# Patient Record
Sex: FEMALE | URBAN - METROPOLITAN AREA
[De-identification: names, ages, dates, MRNs, and addresses within clinical notes are randomized per-mention and may not be internally consistent; named-entity substitution may affect disease eponyms.]

---

## 2021-12-22 ENCOUNTER — APPOINTMENT (RX ONLY)
Dept: URBAN - METROPOLITAN AREA CLINIC 71 | Facility: CLINIC | Age: 41
Setting detail: DERMATOLOGY
End: 2021-12-22

## 2021-12-22 ENCOUNTER — APPOINTMENT (RX ONLY)
Dept: URBAN - METROPOLITAN AREA CLINIC 70 | Facility: CLINIC | Age: 41
Setting detail: DERMATOLOGY
End: 2021-12-22

## 2021-12-22 DIAGNOSIS — Z41.9 ENCOUNTER FOR PROCEDURE FOR PURPOSES OTHER THAN REMEDYING HEALTH STATE, UNSPECIFIED: ICD-10-CM

## 2021-12-22 PROCEDURE — ? DERMAPLANE

## 2021-12-22 PROCEDURE — ? HYDRAFACIAL

## 2021-12-22 ASSESSMENT — LOCATION ZONE DERM
LOCATION ZONE: LIP
LOCATION ZONE: LIP
LOCATION ZONE: FACE
LOCATION ZONE: FACE

## 2021-12-22 ASSESSMENT — LOCATION DETAILED DESCRIPTION DERM
LOCATION DETAILED: RIGHT CENTRAL MALAR CHEEK
LOCATION DETAILED: LEFT MEDIAL FOREHEAD
LOCATION DETAILED: LEFT MEDIAL FOREHEAD
LOCATION DETAILED: RIGHT CENTRAL MALAR CHEEK
LOCATION DETAILED: RIGHT LOWER CUTANEOUS LIP
LOCATION DETAILED: RIGHT LOWER CUTANEOUS LIP
LOCATION DETAILED: RIGHT MEDIAL MALAR CHEEK
LOCATION DETAILED: LEFT CENTRAL MALAR CHEEK
LOCATION DETAILED: LEFT CENTRAL MALAR CHEEK
LOCATION DETAILED: LEFT LOWER CUTANEOUS LIP
LOCATION DETAILED: RIGHT INFERIOR MEDIAL FOREHEAD
LOCATION DETAILED: RIGHT MEDIAL MALAR CHEEK
LOCATION DETAILED: LEFT LOWER CUTANEOUS LIP
LOCATION DETAILED: RIGHT INFERIOR MEDIAL FOREHEAD

## 2021-12-22 ASSESSMENT — LOCATION SIMPLE DESCRIPTION DERM
LOCATION SIMPLE: RIGHT CHEEK
LOCATION SIMPLE: LEFT LIP
LOCATION SIMPLE: LEFT FOREHEAD
LOCATION SIMPLE: LEFT FOREHEAD
LOCATION SIMPLE: RIGHT FOREHEAD
LOCATION SIMPLE: RIGHT CHEEK
LOCATION SIMPLE: RIGHT LIP
LOCATION SIMPLE: RIGHT FOREHEAD
LOCATION SIMPLE: RIGHT LIP
LOCATION SIMPLE: LEFT LIP
LOCATION SIMPLE: LEFT CHEEK
LOCATION SIMPLE: LEFT CHEEK

## 2021-12-22 NOTE — PROCEDURE: DERMAPLANE
Treatment Areas: face
Detail Level: Zone
Treatment Area Prep: acetone
Price (Use Numbers Only, No Special Characters Or $): 150
Post-Care Instructions: I reviewed with the patient in detail post-care instructions.
Pre-Procedure Text: The patient was placed in a recumbant position on the procedure table. Cleansed skin with simply clean cleanser and removed with warm towel.
Blade: [place] scalpel

## 2021-12-22 NOTE — PROCEDURE: DERMAPLANE
Treatment Areas: face
Pre-Procedure Text: The patient was placed in a recumbant position on the procedure table. Cleansed skin with simply clean cleanser and removed with warm towel.
Post-Care Instructions: I reviewed with the patient in detail post-care instructions.
Detail Level: Zone
Blade: [place] scalpel
Price (Use Numbers Only, No Special Characters Or $): 150
Treatment Area Prep: acetone

## 2021-12-22 NOTE — PROCEDURE: HYDRAFACIAL
Procedure: Exfoliation
Tip: Hydropeel Tip, Clear
Vacuum Pressure High Setting (Will Not Render If Set To 0): 24
Vacuum Pressure High Setting (Will Not Render If Set To 0): 0
Procedure: Peel
Vacuum Pressure Low Setting (Will Not Render If Set To 0): 16
Solution: Activ-4
Indication: prominent pores
Location: face
Tip: Hydropeel Tip, Blue
Solution: Beta-HD
Post-Care Instructions: I reviewed with the patient in detail post-care instructions. Patient should stay away from the sun and wear sun protection until treated areas are fully healed.
Solution: GlySal 7.5%
Treatment Number: 1
Solution: Antiox-6
Procedure: Extend and Protect
Solution Override
Tip: Hydropeel Tip, Teal
Consent: Written consent obtained, risks reviewed including but not limited to crusting, scabbing, blistering, scarring, darker or lighter pigmentary change, bruising, and/or incomplete response.
Procedure: Extraction
Price (Use Numbers Only, No Special Characters Or $): 175

## 2021-12-22 NOTE — PROCEDURE: HYDRAFACIAL
Procedure: Extraction
Vacuum Pressure High Setting (Will Not Render If Set To 0): 0
Solution: Activ-4
Tip: Hydropeel Tip, Teal
Post-Care Instructions: I reviewed with the patient in detail post-care instructions. Patient should stay away from the sun and wear sun protection until treated areas are fully healed.
Procedure: Peel
Procedure: Extend and Protect
Solution Override
Solution: Beta-HD
Tip: Hydropeel Tip, Clear
Vacuum Pressure Low Setting (Will Not Render If Set To 0): 16
Solution: Antiox-6
Price (Use Numbers Only, No Special Characters Or $): 175
Solution: GlySal 7.5%
Tip: Hydropeel Tip, Blue
Treatment Number: 1
Vacuum Pressure High Setting (Will Not Render If Set To 0): 24
Indication: prominent pores
Procedure: Exfoliation
Location: face
Consent: Written consent obtained, risks reviewed including but not limited to crusting, scabbing, blistering, scarring, darker or lighter pigmentary change, bruising, and/or incomplete response.